# Patient Record
Sex: FEMALE
[De-identification: names, ages, dates, MRNs, and addresses within clinical notes are randomized per-mention and may not be internally consistent; named-entity substitution may affect disease eponyms.]

---

## 2017-03-14 ENCOUNTER — HOSPITAL ENCOUNTER (EMERGENCY)
Dept: HOSPITAL 82 - ED | Age: 47
LOS: 1 days | Discharge: HOME | DRG: 392 | End: 2017-03-15
Payer: SELF-PAY

## 2017-03-14 VITALS — BODY MASS INDEX: 29.3 KG/M2 | HEIGHT: 63 IN | WEIGHT: 165.35 LBS

## 2017-03-14 DIAGNOSIS — R10.13: Primary | ICD-10-CM

## 2017-03-14 DIAGNOSIS — R11.2: ICD-10-CM

## 2017-03-14 DIAGNOSIS — R10.11: ICD-10-CM

## 2017-03-14 LAB
ALBUMIN SERPL-MCNC: 4 G/DL (ref 3.2–5)
ALP SERPL-CCNC: 91 U/L (ref 38–126)
ALT SERPL-CCNC: 128 U/L (ref 9–52)
AMYLASE SERPL-CCNC: 70 U/L (ref 30–110)
ANION GAP SERPL CALCULATED.3IONS-SCNC: 16 MMOL/L
AST SERPL-CCNC: 88 U/L (ref 14–36)
BASOPHILS NFR BLD AUTO: 1 % (ref 0–3)
BILIRUB UR QL STRIP.AUTO: NEGATIVE
BUN SERPL-MCNC: 12 MG/DL (ref 7–17)
BUN/CREAT SERPL: 21
CALCIUM SERPL-MCNC: 9 MG/DL (ref 8.4–10.2)
CHLORIDE SERPL-SCNC: 107 MMOL/L (ref 95–108)
CLARITY UR: CLEAR
CO2 SERPL-SCNC: 24 MMOL/L (ref 22–30)
COLOR UR AUTO: YELLOW
CREAT SERPL-MCNC: 0.6 MG/DL (ref 0.5–1)
EOSINOPHIL NFR BLD AUTO: 4 % (ref 0–8)
ERYTHROCYTE [DISTWIDTH] IN BLOOD BY AUTOMATED COUNT: 12.1 % (ref 11.5–15.5)
GLUCOSE SERPL-MCNC: 89 MG/DL (ref 65–105)
GLUCOSE UR STRIP.AUTO-MCNC: NEGATIVE MG/DL
HCT VFR BLD AUTO: 42 % (ref 37–47)
HGB BLD-MCNC: 14.4 G/DL (ref 12–16)
HGB UR QL STRIP.AUTO: NEGATIVE
IMM GRANULOCYTES NFR BLD: 0.6 % (ref 0–1)
KETONES UR STRIP.AUTO-MCNC: NEGATIVE MG/DL
LEUKOCYTE ESTERASE UR QL STRIP.AUTO: NEGATIVE
LIPASE SERPL-CCNC: 74 U/L (ref 23–300)
LYMPHOCYTES NFR BLD: 43 % (ref 15–41)
MCH RBC QN AUTO: 31.6 PG  CALC (ref 26–32)
MCHC RBC AUTO-ENTMCNC: 34.3 G/L CALC (ref 32–36)
MCV RBC AUTO: 92.1 FL  CALC (ref 80–100)
MONOCYTES NFR BLD AUTO: 10 % (ref 2–13)
NEUTROPHILS # BLD AUTO: 2.57 THOU/UL (ref 2–7.15)
NEUTROPHILS NFR BLD AUTO: 42 % (ref 42–76)
NITRITE UR QL STRIP.AUTO: NEGATIVE
PH UR STRIP.AUTO: 6 [PH] (ref 4.5–8)
PLATELET # BLD AUTO: 300 THOU/UL (ref 130–400)
POTASSIUM SERPL-SCNC: 3.9 MMOL/L (ref 3.5–5.1)
PROT SERPL-MCNC: 7.7 G/DL (ref 6.3–8.2)
PROT UR QL STRIP.AUTO: NEGATIVE MG/DL
RBC # BLD AUTO: 4.56 MILL/UL (ref 4.2–5.6)
SODIUM SERPL-SCNC: 142 MMOL/L (ref 137–146)
SP GR UR STRIP.AUTO: 1.02
UROBILINOGEN UR QL STRIP.AUTO: 0.2 E.U./DL

## 2017-03-14 PROCEDURE — S0164 INJECTION PANTROPRAZOLE: HCPCS

## 2017-03-15 VITALS — SYSTOLIC BLOOD PRESSURE: 138 MMHG | DIASTOLIC BLOOD PRESSURE: 76 MMHG

## 2017-04-26 ENCOUNTER — HOSPITAL ENCOUNTER (EMERGENCY)
Dept: HOSPITAL 82 - ED | Age: 47
Discharge: HOME | DRG: 605 | End: 2017-04-26
Payer: SELF-PAY

## 2017-04-26 VITALS — BODY MASS INDEX: 26.56 KG/M2 | HEIGHT: 63 IN | WEIGHT: 149.91 LBS

## 2017-04-26 VITALS — DIASTOLIC BLOOD PRESSURE: 74 MMHG | SYSTOLIC BLOOD PRESSURE: 149 MMHG
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
## 2017-04-26 DIAGNOSIS — S30.1XXA: Primary | ICD-10-CM

## 2017-04-26 DIAGNOSIS — R10.84: ICD-10-CM

## 2017-04-26 LAB
ALBUMIN SERPL-MCNC: 4.4 G/DL (ref 3.2–5)
ALP SERPL-CCNC: 131 U/L (ref 38–126)
ALT SERPL-CCNC: 210 U/L (ref 9–52)
ANION GAP SERPL CALCULATED.3IONS-SCNC: 15 MMOL/L
AST SERPL-CCNC: 142 U/L (ref 14–36)
BASOPHILS NFR BLD AUTO: 1 % (ref 0–3)
BILIRUB UR QL STRIP.AUTO: NEGATIVE
BUN SERPL-MCNC: 17 MG/DL (ref 7–17)
BUN/CREAT SERPL: 26
CALCIUM SERPL-MCNC: 9.5 MG/DL (ref 8.4–10.2)
CHLORIDE SERPL-SCNC: 104 MMOL/L (ref 95–108)
CLARITY UR: CLEAR
CO2 SERPL-SCNC: 26 MMOL/L (ref 22–30)
COLOR UR AUTO: YELLOW
CREAT SERPL-MCNC: 0.6 MG/DL (ref 0.5–1)
EOSINOPHIL NFR BLD AUTO: 2 % (ref 0–8)
ERYTHROCYTE [DISTWIDTH] IN BLOOD BY AUTOMATED COUNT: 12.4 % (ref 11.5–15.5)
GLUCOSE SERPL-MCNC: 115 MG/DL (ref 65–105)
GLUCOSE UR STRIP.AUTO-MCNC: NEGATIVE MG/DL
HCT VFR BLD AUTO: 40.4 % (ref 37–47)
HGB BLD-MCNC: 14.3 G/DL (ref 12–16)
HGB UR QL STRIP.AUTO: (no result)
IMM GRANULOCYTES NFR BLD: 0.3 % (ref 0–1)
KETONES UR STRIP.AUTO-MCNC: NEGATIVE MG/DL
LEUKOCYTE ESTERASE UR QL STRIP.AUTO: NEGATIVE
LYMPHOCYTES NFR BLD: 21 % (ref 15–41)
MCH RBC QN AUTO: 32.9 PG  CALC (ref 26–32)
MCHC RBC AUTO-ENTMCNC: 35.4 G/L CALC (ref 32–36)
MCV RBC AUTO: 92.9 FL  CALC (ref 80–100)
MONOCYTES NFR BLD AUTO: 9 % (ref 2–13)
NEUTROPHILS # BLD AUTO: 5.25 THOU/UL (ref 2–7.15)
NEUTROPHILS NFR BLD AUTO: 67 % (ref 42–76)
NITRITE UR QL STRIP.AUTO: NEGATIVE
PH UR STRIP.AUTO: 6.5 [PH] (ref 4.5–8)
PLATELET # BLD AUTO: 290 THOU/UL (ref 130–400)
POTASSIUM SERPL-SCNC: 3.9 MMOL/L (ref 3.5–5.1)
PROT SERPL-MCNC: 8.4 G/DL (ref 6.3–8.2)
PROT UR QL STRIP.AUTO: (no result) MG/DL
RBC # BLD AUTO: 4.35 MILL/UL (ref 4.2–5.6)
SODIUM SERPL-SCNC: 141 MMOL/L (ref 137–146)
SP GR UR STRIP.AUTO: 1.01
UROBILINOGEN UR QL STRIP.AUTO: 0.2 E.U./DL

## 2018-10-20 ENCOUNTER — HOSPITAL ENCOUNTER (EMERGENCY)
Dept: HOSPITAL 82 - ED | Age: 48
LOS: 1 days | Discharge: LEFT BEFORE DISCHARGE | DRG: 313 | End: 2018-10-21
Payer: SELF-PAY

## 2018-10-20 VITALS — SYSTOLIC BLOOD PRESSURE: 120 MMHG | DIASTOLIC BLOOD PRESSURE: 62 MMHG

## 2018-10-20 VITALS — HEIGHT: 63 IN | WEIGHT: 150.36 LBS | BODY MASS INDEX: 26.64 KG/M2

## 2018-10-20 DIAGNOSIS — R07.9: Primary | ICD-10-CM

## 2018-10-20 DIAGNOSIS — Z91.19: ICD-10-CM

## 2018-10-20 DIAGNOSIS — F41.9: ICD-10-CM

## 2018-10-20 DIAGNOSIS — E66.9: ICD-10-CM

## 2018-10-20 LAB
ALBUMIN SERPL-MCNC: 4.1 G/DL (ref 3.2–5)
ALP SERPL-CCNC: 134 U/L (ref 38–126)
ANION GAP SERPL CALCULATED.3IONS-SCNC: 14 MMOL/L
APTT PPP: 25.7 SECONDS (ref 20–32.5)
AST SERPL-CCNC: 51 U/L (ref 14–36)
BASOPHILS NFR BLD AUTO: 1 % (ref 0–3)
BUN SERPL-MCNC: 21 MG/DL (ref 7–17)
BUN/CREAT SERPL: 35
CHLORIDE SERPL-SCNC: 108 MMOL/L (ref 95–108)
CO2 SERPL-SCNC: 25 MMOL/L (ref 22–30)
CREAT SERPL-MCNC: 0.6 MG/DL (ref 0.5–1)
EOSINOPHIL NFR BLD AUTO: 3 % (ref 0–8)
ERYTHROCYTE [DISTWIDTH] IN BLOOD BY AUTOMATED COUNT: 12 % (ref 11.5–15.5)
HCT VFR BLD AUTO: 40.1 % (ref 37–47)
HGB BLD-MCNC: 13.8 G/DL (ref 12–16)
IMM GRANULOCYTES NFR BLD: 0.2 % (ref 0–5)
INR PPP: 1 RATIO (ref 0.7–1.3)
LYMPHOCYTES NFR BLD: 41 % (ref 15–41)
MCH RBC QN AUTO: 32.7 PG  CALC (ref 26–32)
MCHC RBC AUTO-ENTMCNC: 34.4 G/L CALC (ref 32–36)
MCV RBC AUTO: 95 FL  CALC (ref 80–100)
MONOCYTES NFR BLD AUTO: 9 % (ref 2–13)
MYOGLOBIN SERPL-MCNC: 32 NG/ML (ref 0–62)
NEUTROPHILS # BLD AUTO: 3.77 THOU/UL (ref 2–7.15)
NEUTROPHILS NFR BLD AUTO: 46 % (ref 42–76)
PLATELET # BLD AUTO: 311 THOU/UL (ref 130–400)
POTASSIUM SERPL-SCNC: 3.4 MMOL/L (ref 3.5–5.1)
PROT SERPL-MCNC: 7.7 G/DL (ref 6.3–8.2)
PROTHROMBIN TIME: 10.2 SECONDS (ref 9–12.5)
RBC # BLD AUTO: 4.22 MILL/UL (ref 4.2–5.6)
SODIUM SERPL-SCNC: 143 MMOL/L (ref 137–146)

## 2018-11-05 ENCOUNTER — HOSPITAL ENCOUNTER (EMERGENCY)
Dept: HOSPITAL 82 - ED | Age: 48
Discharge: HOME | DRG: 605 | End: 2018-11-05
Payer: SELF-PAY

## 2018-11-05 VITALS — HEIGHT: 63 IN | WEIGHT: 138.89 LBS | BODY MASS INDEX: 24.61 KG/M2

## 2018-11-05 VITALS — SYSTOLIC BLOOD PRESSURE: 138 MMHG | DIASTOLIC BLOOD PRESSURE: 73 MMHG
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
## 2018-11-05 DIAGNOSIS — R22.31: ICD-10-CM

## 2018-11-05 DIAGNOSIS — S61.451A: Primary | ICD-10-CM
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
## 2018-11-05 DIAGNOSIS — S60.511A: ICD-10-CM

## 2019-02-11 ENCOUNTER — HOSPITAL ENCOUNTER (EMERGENCY)
Dept: HOSPITAL 82 - ED | Age: 49
Discharge: HOME | DRG: 392 | End: 2019-02-11
Payer: SELF-PAY

## 2019-02-11 VITALS — SYSTOLIC BLOOD PRESSURE: 127 MMHG | DIASTOLIC BLOOD PRESSURE: 70 MMHG

## 2019-02-11 VITALS — WEIGHT: 198.42 LBS | HEIGHT: 63 IN | BODY MASS INDEX: 35.16 KG/M2

## 2019-02-11 DIAGNOSIS — R10.11: Primary | ICD-10-CM

## 2019-02-11 LAB
ALBUMIN SERPL-MCNC: 4.5 G/DL (ref 3.2–5)
ALP SERPL-CCNC: 84 U/L (ref 38–126)
ANION GAP SERPL CALCULATED.3IONS-SCNC: 15 MMOL/L
AST SERPL-CCNC: 89 U/L (ref 14–36)
BASOPHILS NFR BLD AUTO: 1 % (ref 0–3)
BILIRUB UR QL STRIP.AUTO: NEGATIVE
BUN SERPL-MCNC: 10 MG/DL (ref 7–17)
BUN/CREAT SERPL: 19
CHLORIDE SERPL-SCNC: 103 MMOL/L (ref 95–108)
CO2 SERPL-SCNC: 25 MMOL/L (ref 22–30)
COLOR UR AUTO: YELLOW
CREAT SERPL-MCNC: 0.5 MG/DL (ref 0.5–1)
EOSINOPHIL NFR BLD AUTO: 5 % (ref 0–8)
ERYTHROCYTE [DISTWIDTH] IN BLOOD BY AUTOMATED COUNT: 12.3 % (ref 11.5–15.5)
GLUCOSE UR STRIP.AUTO-MCNC: NEGATIVE MG/DL
HCT VFR BLD AUTO: 44.7 % (ref 37–47)
HGB BLD-MCNC: 15.3 G/DL (ref 12–16)
HGB UR QL STRIP.AUTO: NEGATIVE
IMM GRANULOCYTES NFR BLD: 0.2 % (ref 0–5)
KETONES UR STRIP.AUTO-MCNC: NEGATIVE MG/DL
LEUKOCYTE ESTERASE UR QL STRIP.AUTO: NEGATIVE
LIPASE SERPL-CCNC: 75 U/L (ref 23–300)
LYMPHOCYTES NFR BLD: 41 % (ref 15–41)
MCH RBC QN AUTO: 32.3 PG  CALC (ref 26–32)
MCHC RBC AUTO-ENTMCNC: 34.2 G/L CALC (ref 32–36)
MCV RBC AUTO: 94.3 FL  CALC (ref 80–100)
MONOCYTES NFR BLD AUTO: 11 % (ref 2–13)
NEUTROPHILS # BLD AUTO: 2.7 THOU/UL (ref 2–7.15)
NEUTROPHILS NFR BLD AUTO: 42 % (ref 42–76)
NITRITE UR QL STRIP.AUTO: NEGATIVE
PH UR STRIP.AUTO: 6.5 [PH] (ref 4.5–8)
PLATELET # BLD AUTO: 316 THOU/UL (ref 130–400)
POTASSIUM SERPL-SCNC: 4.3 MMOL/L (ref 3.5–5.1)
PROT SERPL-MCNC: 8.2 G/DL (ref 6.3–8.2)
PROT UR QL STRIP.AUTO: NEGATIVE MG/DL
RBC # BLD AUTO: 4.74 MILL/UL (ref 4.2–5.6)
SODIUM SERPL-SCNC: 139 MMOL/L (ref 137–146)
SP GR UR STRIP.AUTO: 1.01
UROBILINOGEN UR QL STRIP.AUTO: 0.2 E.U./DL

## 2020-02-09 ENCOUNTER — HOSPITAL ENCOUNTER (EMERGENCY)
Age: 50
Discharge: HOME | DRG: 153 | End: 2020-02-09
Payer: SELF-PAY

## 2020-02-09 DIAGNOSIS — H66.92: Primary | ICD-10-CM

## 2020-02-09 DIAGNOSIS — J02.9: ICD-10-CM

## 2020-02-09 LAB
BILIRUB UR QL STRIP.AUTO: NEGATIVE
COLOR UR AUTO: YELLOW
GLUCOSE UR STRIP.AUTO-MCNC: NEGATIVE MG/DL
HGB UR QL STRIP.AUTO: NEGATIVE
KETONES UR STRIP.AUTO-MCNC: NEGATIVE MG/DL
LEUKOCYTE ESTERASE UR QL STRIP.AUTO: (no result)
NITRITE UR QL STRIP.AUTO: NEGATIVE
PH UR STRIP.AUTO: 6.5 [PH] (ref 4.5–8)
PROT UR QL STRIP.AUTO: NEGATIVE MG/DL
SP GR UR STRIP.AUTO: 1.02
UROBILINOGEN UR QL STRIP.AUTO: 0.2 E.U./DL

## 2020-09-02 ENCOUNTER — HOSPITAL ENCOUNTER (OUTPATIENT)
Dept: HOSPITAL 82 - ED | Age: 50
Setting detail: OBSERVATION
LOS: 1 days | Discharge: HOME | DRG: 313 | End: 2020-09-03
Attending: INTERNAL MEDICINE | Admitting: INTERNAL MEDICINE
Payer: SELF-PAY

## 2020-09-02 VITALS — SYSTOLIC BLOOD PRESSURE: 149 MMHG | DIASTOLIC BLOOD PRESSURE: 76 MMHG

## 2020-09-02 VITALS — WEIGHT: 172.37 LBS | BODY MASS INDEX: 33.84 KG/M2 | HEIGHT: 60 IN

## 2020-09-02 VITALS — SYSTOLIC BLOOD PRESSURE: 135 MMHG | DIASTOLIC BLOOD PRESSURE: 66 MMHG

## 2020-09-02 VITALS — DIASTOLIC BLOOD PRESSURE: 73 MMHG | SYSTOLIC BLOOD PRESSURE: 141 MMHG

## 2020-09-02 DIAGNOSIS — Z20.828: ICD-10-CM

## 2020-09-02 DIAGNOSIS — I49.3: ICD-10-CM

## 2020-09-02 DIAGNOSIS — I49.1: ICD-10-CM

## 2020-09-02 DIAGNOSIS — R00.1: ICD-10-CM

## 2020-09-02 DIAGNOSIS — R42: ICD-10-CM

## 2020-09-02 DIAGNOSIS — R07.89: Primary | ICD-10-CM

## 2020-09-02 LAB
ALBUMIN SERPL-MCNC: 4.1 G/DL (ref 3.2–5)
ALP SERPL-CCNC: 109 U/L (ref 38–126)
ANION GAP SERPL CALCULATED.3IONS-SCNC: 10 MMOL/L
AST SERPL-CCNC: 59 U/L (ref 14–36)
BASOPHILS NFR BLD AUTO: 1 % (ref 0–3)
BUN SERPL-MCNC: 17 MG/DL (ref 7–17)
BUN/CREAT SERPL: 31
CHLORIDE SERPL-SCNC: 105 MMOL/L (ref 95–108)
CO2 SERPL-SCNC: 27 MMOL/L (ref 22–30)
CREAT SERPL-MCNC: 0.6 MG/DL (ref 0.5–1)
EOSINOPHIL NFR BLD AUTO: 4 % (ref 0–8)
ERYTHROCYTE [DISTWIDTH] IN BLOOD BY AUTOMATED COUNT: 12.2 % (ref 11.5–15.5)
HCT VFR BLD AUTO: 42.9 % (ref 37–47)
HGB BLD-MCNC: 14.3 G/DL (ref 12–16)
IMM GRANULOCYTES NFR BLD: 0.1 % (ref 0–5)
LIPASE SERPL-CCNC: 83 U/L (ref 23–300)
LYMPHOCYTES NFR BLD: 43 % (ref 15–41)
MCH RBC QN AUTO: 31.6 PG  CALC (ref 26–32)
MCHC RBC AUTO-ENTMCNC: 33.3 G/DL CAL (ref 32–36)
MCV RBC AUTO: 94.7 FL  CALC (ref 80–100)
MONOCYTES NFR BLD AUTO: 10 % (ref 2–13)
NEUTROPHILS # BLD AUTO: 2.83 THOU/UL (ref 2–7.15)
NEUTROPHILS NFR BLD AUTO: 42 % (ref 42–76)
PLATELET # BLD AUTO: 267 THOU/UL (ref 130–400)
POTASSIUM SERPL-SCNC: 3.5 MMOL/L (ref 3.5–5.1)
PROT SERPL-MCNC: 7.6 G/DL (ref 6.3–8.2)
RBC # BLD AUTO: 4.53 MILL/UL (ref 4.2–5.6)
SODIUM SERPL-SCNC: 139 MMOL/L (ref 137–146)

## 2020-09-02 PROCEDURE — G0378 HOSPITAL OBSERVATION PER HR: HCPCS

## 2020-09-02 NOTE — NUR
UGO MARCIAL AND STEFANIA AT BEDSIDE DISCUSSING ADMISSION. PT PLACED CONCERNS AND PLAN
OF CARE MENTIONED.

## 2020-09-02 NOTE — NUR
PT BRADYCARDIC WITH HR FLUCTUATING FROM 42-50 BPM. AGGIE ARIZMENDI NOTIFIED,
ALREADY AWARE OF TREND. CONTINUE TO MONITOR PT. PT CURRENTLY ASYMPTOMATIC
BESIDES FEELING DIZZINESS. CALL LIGHT IN REACH. CONTINUE TO MONITOR.

## 2020-09-02 NOTE — NUR
Admission Note
 
Report Given to:         THAI
Transported by:          X Wheelchair           Stretcher
 
Transported with:        X Nurse     Transporter   X Patent IV    O2
                         X Cardiac Monitor
 
Location:                 ICU      X MS2
 
 
          
 
         PT TRANSPORTED STABLE AND IN NO DISTRESS TO MED SURG. CARE ASSUMED TO
THAI

## 2020-09-02 NOTE — NUR
PT RESTING IN BED, NO SIGNS OF DISTRESS NOTED, RESP EVEN AND UNLABORED, PT
ALERT AND ORIENTED X3, DISCUSSED POC, DENIES ANY PAIN AT THIS TIME. ASSESSMENT
COMPLETED. CALL LIGHT IN REACH,CONTINUE TO MONITOR.

## 2020-09-02 NOTE — NUR
PT RECONNECTED TO MONITORING EQUIPMENT. DENIES ANY NEEDS. CHEST PAIN HAS
REDUCED TO 0/10. CALL LIGHT WITHIN REACH

## 2020-09-02 NOTE — NUR
PT SITTING ON THE SIDE OF THE BED EATING DINNER. NO NEEDS AT THIS TIME. CALL
LIGHT IN REACH. CONTINUE TO MONITOR.

## 2020-09-02 NOTE — NUR
PT AMBULATED WITH STEADY GAIT TO ROOM AND STATES HAVING PAIN 7/10 IN THE CHEST.
PT DENIES N/V, BLURRED VISION, OR SOB. SHE DOES ADMIT TO HAVING DIZZINESS.
SINUS THERESA OF 47-56 BPM. STATING OF INTERM WAVES OF POTENTIAL SYNCOPY. SKIN
WNL.WEAKNESS NOTED. NIH 0. PERRLA. AOX4. STATES HAVING "HEART PROBLEMS' 2 YEARS
AGO BUT CANNOT BE SPECIFIC. DENIES TAKING ANY MEDICATIONS DAILY, DENIES ANY
PRESCRIPTIONS PRESCRIBED. UPPER LEFT CHEST TENDER UPON PALPATION. WILL CONTINUE
TO MONITOR.
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
## 2020-09-03 VITALS — DIASTOLIC BLOOD PRESSURE: 84 MMHG | SYSTOLIC BLOOD PRESSURE: 145 MMHG

## 2020-09-03 VITALS — DIASTOLIC BLOOD PRESSURE: 97 MMHG | SYSTOLIC BLOOD PRESSURE: 134 MMHG

## 2020-09-03 VITALS — SYSTOLIC BLOOD PRESSURE: 146 MMHG | DIASTOLIC BLOOD PRESSURE: 81 MMHG

## 2020-09-03 VITALS — DIASTOLIC BLOOD PRESSURE: 66 MMHG | SYSTOLIC BLOOD PRESSURE: 103 MMHG

## 2020-09-03 VITALS — SYSTOLIC BLOOD PRESSURE: 133 MMHG | DIASTOLIC BLOOD PRESSURE: 72 MMHG

## 2020-09-03 LAB
ALBUMIN SERPL-MCNC: 3.8 G/DL (ref 3.2–5)
ALP SERPL-CCNC: 93 U/L (ref 38–126)
ANION GAP SERPL CALCULATED.3IONS-SCNC: 9 MMOL/L
AST SERPL-CCNC: 55 U/L (ref 14–36)
BASOPHILS NFR BLD AUTO: 1 % (ref 0–3)
BUN SERPL-MCNC: 14 MG/DL (ref 7–17)
BUN/CREAT SERPL: 30
CHLORIDE SERPL-SCNC: 104 MMOL/L (ref 95–108)
CHOLEST SERPL-MCNC: 192 MG/DL (ref 0–199)
CHOLEST/HDLC SERPL: 5 {RATIO}
CO2 SERPL-SCNC: 28 MMOL/L (ref 22–30)
CREAT SERPL-MCNC: 0.5 MG/DL (ref 0.5–1)
EOSINOPHIL NFR BLD AUTO: 5 % (ref 0–8)
ERYTHROCYTE [DISTWIDTH] IN BLOOD BY AUTOMATED COUNT: 12 % (ref 11.5–15.5)
HCT VFR BLD AUTO: 40.5 % (ref 37–47)
HDLC SERPL-MCNC: 39 MG/DL (ref 40–?)
HGB BLD-MCNC: 13.6 G/DL (ref 12–16)
IMM GRANULOCYTES NFR BLD: 0 % (ref 0–5)
LDLC SERPL CALC-MCNC: 124 MG/DL
LYMPHOCYTES NFR BLD: 46 % (ref 15–41)
MAGNESIUM SERPL-MCNC: 2.1 MG/DL (ref 1.6–2.3)
MCH RBC QN AUTO: 31.8 PG  CALC (ref 26–32)
MCHC RBC AUTO-ENTMCNC: 33.6 G/DL CAL (ref 32–36)
MCV RBC AUTO: 94.6 FL  CALC (ref 80–100)
MONOCYTES NFR BLD AUTO: 8 % (ref 2–13)
NEUTROPHILS # BLD AUTO: 2.32 THOU/UL (ref 2–7.15)
NEUTROPHILS NFR BLD AUTO: 41 % (ref 42–76)
PLATELET # BLD AUTO: 246 THOU/UL (ref 130–400)
POTASSIUM SERPL-SCNC: 3.7 MMOL/L (ref 3.5–5.1)
PROT SERPL-MCNC: 7 G/DL (ref 6.3–8.2)
RBC # BLD AUTO: 4.28 MILL/UL (ref 4.2–5.6)
SODIUM SERPL-SCNC: 137 MMOL/L (ref 137–146)
TRIGL SERPL-MCNC: 148 MG/DL (ref 30–149)
VLDLC SERPL CALC-MCNC: 30 MG/DL

## 2020-09-03 NOTE — NUR
IV SITE D/C'D, CATH INTACT, 2X2 WRAPPED WITH COBAN APPLIED. TELE REMOVED. PT
DOWN TO LOBBY [facility]ELCHAIR[facility]

## 2020-09-03 NOTE — NUR
PT C/O WEAKNESS AND FEELING SOB. VS TAKEN. 145/84, HR 53 AND O2 AT 95% RA. HOB
ELEVATED. PT REPORTS TO FEEL SLIGHTLY BETTER AFTER HOB WAS ELEVATED. CALL
LIGHT IN REACH. ENCOURAGED PT TO CALL IF SOB RETURNED.

## 2020-09-03 NOTE — NUR
PT RESTING IN BED, NO SIGNS OF DISTRESS NOTED, RESP EVEN AND UNLABORED.
REQUESTING TEA. CALL LIGHT IN REACH,CONTINUE TO MONITOR.

## 2020-09-03 NOTE — NUR
D/C INSTRUCTIONS GIVEN AND REVIEWED WITH PT. PT STILL AWAITING ON TRANSPORT TO
GO HOME. IV LEFT IN PLACE AND TO BE REMOVED WHEN D/C'D

## 2020-09-03 NOTE — NUR
PT LAYING IN BED WATCHING TV. A&O X3. NO DISTRESS NOTED. PT REPORTS TO BE
FEELING SLIGHTLY BETTER THAN YESTERDAY. NO OTHER NEEDS AT THIS TIME.
ASSESSMENT COMPLETED. DISCUSSED POC. CALL LIGHT IN REACH. CONTINUE TO MONITOR.

## 2021-03-02 ENCOUNTER — HOSPITAL ENCOUNTER (OUTPATIENT)
Dept: HOSPITAL 82 - ED | Age: 51
Setting detail: OBSERVATION
LOS: 2 days | Discharge: HOME | DRG: 313 | End: 2021-03-04
Attending: INTERNAL MEDICINE | Admitting: INTERNAL MEDICINE
Payer: SELF-PAY

## 2021-03-02 VITALS — SYSTOLIC BLOOD PRESSURE: 138 MMHG | DIASTOLIC BLOOD PRESSURE: 91 MMHG

## 2021-03-02 VITALS — SYSTOLIC BLOOD PRESSURE: 138 MMHG | DIASTOLIC BLOOD PRESSURE: 66 MMHG

## 2021-03-02 VITALS — SYSTOLIC BLOOD PRESSURE: 140 MMHG | DIASTOLIC BLOOD PRESSURE: 79 MMHG

## 2021-03-02 VITALS — HEIGHT: 60 IN | WEIGHT: 178.57 LBS | BODY MASS INDEX: 35.06 KG/M2

## 2021-03-02 VITALS — SYSTOLIC BLOOD PRESSURE: 151 MMHG | DIASTOLIC BLOOD PRESSURE: 82 MMHG

## 2021-03-02 DIAGNOSIS — J11.1: ICD-10-CM

## 2021-03-02 DIAGNOSIS — R51.9: ICD-10-CM

## 2021-03-02 DIAGNOSIS — I10: ICD-10-CM

## 2021-03-02 DIAGNOSIS — I49.5: ICD-10-CM

## 2021-03-02 DIAGNOSIS — Z20.822: ICD-10-CM

## 2021-03-02 DIAGNOSIS — Z95.0: ICD-10-CM

## 2021-03-02 DIAGNOSIS — R07.89: Primary | ICD-10-CM

## 2021-03-02 DIAGNOSIS — M54.2: ICD-10-CM

## 2021-03-02 LAB
ALBUMIN SERPL-MCNC: 4.2 G/DL (ref 3.2–5)
ALP SERPL-CCNC: 93 U/L (ref 38–126)
AMYLASE SERPL-CCNC: 73 U/L (ref 30–110)
ANION GAP SERPL CALCULATED.3IONS-SCNC: 11 MMOL/L
APTT PPP: 25.4 SECONDS (ref 20–32.5)
AST SERPL-CCNC: 63 U/L (ref 14–36)
BASOPHILS NFR BLD AUTO: 1 % (ref 0–3)
BILIRUB UR QL STRIP.AUTO: NEGATIVE
BUN SERPL-MCNC: 14 MG/DL (ref 7–17)
BUN/CREAT SERPL: 24
CHLORIDE SERPL-SCNC: 104 MMOL/L (ref 95–108)
CO2 SERPL-SCNC: 28 MMOL/L (ref 22–30)
COLOR UR AUTO: YELLOW
CREAT SERPL-MCNC: 0.6 MG/DL (ref 0.5–1)
D DIMER PPP FEU-MCNC: 0.25 MG/L (ref 0.19–0.6)
EOSINOPHIL NFR BLD AUTO: 6 % (ref 0–8)
ERYTHROCYTE [DISTWIDTH] IN BLOOD BY AUTOMATED COUNT: 12.4 % (ref 11.5–15.5)
GLUCOSE UR STRIP.AUTO-MCNC: NEGATIVE MG/DL
HCT VFR BLD AUTO: 44.3 % (ref 37–47)
HGB BLD-MCNC: 14.6 G/DL (ref 12–16)
HGB UR QL STRIP.AUTO: NEGATIVE
IMM GRANULOCYTES NFR BLD: 0.3 % (ref 0–5)
INR PPP: 1.1 RATIO (ref 0.7–1.3)
KETONES UR STRIP.AUTO-MCNC: NEGATIVE MG/DL
LEUKOCYTE ESTERASE UR QL STRIP.AUTO: (no result)
LIPASE SERPL-CCNC: 76 U/L (ref 23–300)
LYMPHOCYTES NFR BLD: 42 % (ref 15–41)
MCH RBC QN AUTO: 31.7 PG  CALC (ref 26–32)
MCHC RBC AUTO-ENTMCNC: 33 G/DL CAL (ref 32–36)
MCV RBC AUTO: 96.3 FL  CALC (ref 80–100)
MONOCYTES NFR BLD AUTO: 9 % (ref 2–13)
NEUTROPHILS # BLD AUTO: 2.88 THOU/UL (ref 2–7.15)
NEUTROPHILS NFR BLD AUTO: 42 % (ref 42–76)
NITRITE UR QL STRIP.AUTO: NEGATIVE
PH UR STRIP.AUTO: 7 [PH] (ref 4.5–8)
PLATELET # BLD AUTO: 255 THOU/UL (ref 130–400)
POTASSIUM SERPL-SCNC: 3.9 MMOL/L (ref 3.5–5.1)
PROT SERPL-MCNC: 8 G/DL (ref 6.3–8.2)
PROT UR QL STRIP.AUTO: NEGATIVE MG/DL
PROTHROMBIN TIME: 11.1 SECONDS (ref 9–12.5)
RBC # BLD AUTO: 4.6 MILL/UL (ref 4.2–5.6)
RBC #/AREA URNS HPF: (no result) RBC/HPF (ref 0–5)
SODIUM SERPL-SCNC: 139 MMOL/L (ref 137–146)
SP GR UR STRIP.AUTO: 1.02
SQUAMOUS URNS QL MICRO: (no result) EPI/HPF
UROBILINOGEN UR QL STRIP.AUTO: 0.2 E.U./DL
WBC #/AREA URNS HPF: (no result) WBC/HPF (ref 0–5)

## 2021-03-02 PROCEDURE — G0378 HOSPITAL OBSERVATION PER HR: HCPCS

## 2021-03-02 NOTE — NUR
PT LAYING IN BED WITH EYES CLOSED, RESPIRATIONS REGULAR AND UNLABORED, APPEARS
TO BE SLEEPING COMFORTABLY. PT WAKES EASILY WITH VERBAL STIMULI. PHYSICAL
ASSESMENT COMPLETE. PT REPORTS HEAD AND NECK PAIN ARE GONE AND SHE IS NOW
EXPERIENCING PAIN TO RUE, ELBOW TO HAND. C/S/M INTACT. PRN APAP AND FLEXERIL
ADMINISTERED CO-CURRENTLY WITH SCHEDULED MEDICATIONS. PLAN OF CARE REVIEWED,
PT VERBALIZES UNDERSTANDING AND DENIES QUESTIONS. DENTURE CUP PROVIDED FOR
PT'S DENTURES. PT DENIES FURTHER NEEDS AT THIS TIME. CALL BELL WITHIN REACH,
AGREES TO CALL PRN.

## 2021-03-02 NOTE — NUR
REPORT RECEIVED FROM SAW SANTOS. PT CURRENTLY LOCATED IN ER ROOM 6 ON
DROPLET PRECAUTIONS FOR POSITIVE FLU A SWAB. RESTING ON STRETCHER SEMI
FOWLERS; ALERT AND ORIENTED X 3; Prydeinig SPEAKING ONLY;  AT BEDSIDE
FOR ADMISSION ASSESSMENT. PT DENIES PAIN CURRENTLY. RESPIRATIONS EVEN AND
UNLABORED ON ROOM AIR. SKIN IS FLUSHED, HOT, AND MOIST; AFEBRILE 96.9; VSS. IV
SITE TO LEFT WRIST APPEARS HEALTHY AND FLUSHES. POC DISCUSSED. PT ENCOURAGED
TO VERBALIZE CONCERNS; STATES UNDERSTANDING. SAFETY MEASURES IN PLACE. CALL
LIGHT WITHIN REACH.

## 2021-03-02 NOTE — NUR
TRANSPORTED TO [facility] ROOM 263 VIA STRETCHER; ALERT AND ORIENTED. AMBULATED
TO BATHROOM FOR VOID AND INTO BED. TELEMETRY BOX APPLIED AND VERIFIED WITH
MONTIOR TECH; SINUS RHYTHM HR 60. VSS. MEDICATIONS ADMINSITERED. ORIENTED TO
NEW ROOM AND CALL LIGHT SYSTEM.  AT BEDSIDE TO REVIEW POC AND
REVIEW VISITOR POLICY. SAFETY MEASURES IN PLACE. CALL LIGHT WITHIN REACH.

## 2021-03-02 NOTE — NUR
PATIENT WILL BENEFIT FROM PHYSICAL THERAPY. ORDER FOR PHYSCIAL THERAPY
EVALUATION, TREATMENT, AND RECOMMENDATION.

## 2021-03-02 NOTE — NUR
IV INTIATED TO LEFT WRIST AREA WITH BLOOD SPECIMENS AND FIRST SET OF BLOOD
CULTURES OBTAINED. SWABS COLLECTED AS PER ORDER. PT AMBULATORY TO BR FOR URINE
SPECIMEN. DISCUSSED WITH PT WAIT TIME FOR RESULTS. VERBALIZED UNDERSTANIDNG.
DENIES ANY NEEDS. CALL LIGHT WITHIN REACH.

## 2021-03-03 VITALS — DIASTOLIC BLOOD PRESSURE: 76 MMHG | SYSTOLIC BLOOD PRESSURE: 143 MMHG

## 2021-03-03 VITALS — DIASTOLIC BLOOD PRESSURE: 72 MMHG | SYSTOLIC BLOOD PRESSURE: 111 MMHG

## 2021-03-03 VITALS — SYSTOLIC BLOOD PRESSURE: 140 MMHG | DIASTOLIC BLOOD PRESSURE: 84 MMHG

## 2021-03-03 VITALS — SYSTOLIC BLOOD PRESSURE: 147 MMHG | DIASTOLIC BLOOD PRESSURE: 83 MMHG

## 2021-03-03 VITALS — DIASTOLIC BLOOD PRESSURE: 63 MMHG | SYSTOLIC BLOOD PRESSURE: 130 MMHG

## 2021-03-03 VITALS — DIASTOLIC BLOOD PRESSURE: 75 MMHG | SYSTOLIC BLOOD PRESSURE: 122 MMHG

## 2021-03-03 LAB
CHOLEST SERPL-MCNC: 229 MG/DL (ref 0–199)
CHOLEST/HDLC SERPL: 4.9 {RATIO}
HDLC SERPL-MCNC: 46 MG/DL (ref 40–?)
LDLC SERPL CALC-MCNC: 159 MG/DL
MAGNESIUM SERPL-MCNC: 2.2 MG/DL (ref 1.6–2.3)
TRIGL SERPL-MCNC: 114 MG/DL (ref 30–149)
VLDLC SERPL CALC-MCNC: 23 MG/DL

## 2021-03-03 NOTE — NUR
PT TOOK A SHOWER, C/O R SIDED ABD DISCOMFORT. INFORMED THE NURSE PRACTIONER.
IV SITE REMAINS FREE FROM REDNESS OR EDEMA., CONTINUE TO OSBERVE AND MONITOR.

## 2021-03-03 NOTE — NUR
ASSESSMENT IS COMPLETED: IV SITE IS FREE FROM REDNESS OR EDEMA. HR IS
REG,PULSES ARE STRONG X4, ABD IS SOFT WITH ACTIV EBS. BREATH SOUDNS ARE
CLEAR,BILATERALLY, TELE MONITOR IN PLACE. C/O HEADACHE AND NECK DISCOMFORT

## 2021-03-03 NOTE — NUR
PT APPEARS TO BE SLEEPING COMFORTABLY, LAYING IN BED WITH EYES CLOSED,
RESPIRATIONS REGULAR AND UNLABORED, NO APPARENT DISTRESS. CALL BELL REMAIN
WITHIN REACH.

## 2021-03-03 NOTE — NUR
PT LYING IN BED SEMI-FOWLERS, TALKING ON MOBILE PHONE. PHYSICAL ASSESMENT
COMPLETE. PT REPORTS HER HEAD FEELS BETTER BUT HER LOWER ANTERIOR RIBS ARE IN
PAIN. REPORTS IT "HURTS IN HER BONES LIKE WHEN YOU HAVE THE FLU", PRN FLEXERIL
AND ULTRAM ADMINISTERED WITH SCHEDULED MEDICATIONS, SEE E-MAR. PLAN OF CARE
REVIEWED, PT PARTICIPATES IN TEACHING, VERBALIZES UNDERSTANDING AND DENIES
QUESTIONS. FRESH ICE WATER AND APPLE JUICE PROVIDED PER PTS REQUEST. PT DENIES
FURTHER NEEDS AT THIS TIME. CALL BELL WITHIN REACH, AGREES TO CALL PRN. BED
LOCKED IN LOW POSITION WITH BEDRAILS UP X2.

## 2021-03-04 VITALS — DIASTOLIC BLOOD PRESSURE: 69 MMHG | SYSTOLIC BLOOD PRESSURE: 144 MMHG

## 2021-03-04 VITALS — DIASTOLIC BLOOD PRESSURE: 66 MMHG | SYSTOLIC BLOOD PRESSURE: 153 MMHG

## 2021-03-04 VITALS — SYSTOLIC BLOOD PRESSURE: 146 MMHG | DIASTOLIC BLOOD PRESSURE: 81 MMHG

## 2021-03-04 NOTE — NUR
PT APPEARS TO BE SLEEPING COMFORTABLY, LAYING IN BED WITH EYES CLOSED,
RESPIRATIONS REGULAR AND UNLABORED, NO APPARENT DISTRESS. CALL BELL REMAIN
WITHIN REACH. BED REMAINS LOCKED IN LOW POSITION WITH BEDRAIL UP X2.

## 2021-03-04 NOTE — NUR
PT D/C INSTRUCTIONS GIVEN TO PT IN [place]e. PT VERBALIZES UNDERSTANDING. PT
ENCOURAGED TO RETURN IF NEW OR WORSENING SYMPTOMS OCCUR. PT INFORMED OF
SCHEDULED PCP APPOINTMENT FOR TOMORROW AT 8:30 AM.

## 2021-03-04 NOTE — NUR
ASSESSMENT IS COMPLETED: IV SITE IS FREE FROM REDNESS OR EDEMA/. HR IS
REG,PULSES ARE STRONG X4, ABD IS SOFT WITH ACTIVE BS. BREATH SOUNDS ARE
CLEAR,BILATERALLY. TELE MONITOR IN PLACE. CONTINUE TO OSBERVE AND MONITOR.

## 2021-03-22 ENCOUNTER — HOSPITAL ENCOUNTER (EMERGENCY)
Dept: HOSPITAL 82 - ED | Age: 51
Discharge: HOME | DRG: 103 | End: 2021-03-22
Payer: SELF-PAY

## 2021-03-22 VITALS — BODY MASS INDEX: 29.43 KG/M2 | WEIGHT: 149.91 LBS | HEIGHT: 60 IN

## 2021-03-22 VITALS — DIASTOLIC BLOOD PRESSURE: 57 MMHG | SYSTOLIC BLOOD PRESSURE: 139 MMHG

## 2021-03-22 DIAGNOSIS — R51.9: Primary | ICD-10-CM

## 2021-03-22 DIAGNOSIS — Z20.822: ICD-10-CM

## 2021-03-22 DIAGNOSIS — Z95.0: ICD-10-CM

## 2021-03-22 DIAGNOSIS — Z87.442: ICD-10-CM

## 2021-03-22 DIAGNOSIS — I10: ICD-10-CM

## 2021-03-22 LAB
ALBUMIN SERPL-MCNC: 4.5 G/DL (ref 3.2–5)
ALP SERPL-CCNC: 130 U/L (ref 38–126)
AMYLASE SERPL-CCNC: 92 U/L (ref 30–110)
ANION GAP SERPL CALCULATED.3IONS-SCNC: 15 MMOL/L
AST SERPL-CCNC: 71 U/L (ref 14–36)
BASOPHILS NFR BLD AUTO: 1 % (ref 0–3)
BUN SERPL-MCNC: 14 MG/DL (ref 7–17)
BUN/CREAT SERPL: 29
CHLORIDE SERPL-SCNC: 103 MMOL/L (ref 95–108)
CO2 SERPL-SCNC: 22 MMOL/L (ref 22–30)
CREAT SERPL-MCNC: 0.5 MG/DL (ref 0.5–1)
EOSINOPHIL NFR BLD AUTO: 2 % (ref 0–8)
ERYTHROCYTE [DISTWIDTH] IN BLOOD BY AUTOMATED COUNT: 12.3 % (ref 11.5–15.5)
HCT VFR BLD AUTO: 45.4 % (ref 37–47)
HGB BLD-MCNC: 15.3 G/DL (ref 12–16)
IMM GRANULOCYTES NFR BLD: 0.1 % (ref 0–5)
LIPASE SERPL-CCNC: 78 U/L (ref 23–300)
LYMPHOCYTES NFR BLD: 38 % (ref 15–41)
MCH RBC QN AUTO: 31.4 PG  CALC (ref 26–32)
MCHC RBC AUTO-ENTMCNC: 33.7 G/DL CAL (ref 32–36)
MCV RBC AUTO: 93.2 FL  CALC (ref 80–100)
MONOCYTES NFR BLD AUTO: 7 % (ref 2–13)
NEUTROPHILS # BLD AUTO: 5.25 THOU/UL (ref 2–7.15)
NEUTROPHILS NFR BLD AUTO: 52 % (ref 42–76)
PLATELET # BLD AUTO: 336 THOU/UL (ref 130–400)
POTASSIUM SERPL-SCNC: 3.9 MMOL/L (ref 3.5–5.1)
PROT SERPL-MCNC: 9.1 G/DL (ref 6.3–8.2)
RBC # BLD AUTO: 4.87 MILL/UL (ref 4.2–5.6)
SODIUM SERPL-SCNC: 136 MMOL/L (ref 137–146)

## 2021-09-09 ENCOUNTER — HOSPITAL ENCOUNTER (EMERGENCY)
Dept: HOSPITAL 82 - ED | Age: 51
Discharge: HOME | DRG: 103 | End: 2021-09-09
Payer: SELF-PAY

## 2021-09-09 VITALS — BODY MASS INDEX: 32.46 KG/M2 | HEIGHT: 60 IN | WEIGHT: 165.35 LBS

## 2021-09-09 VITALS — DIASTOLIC BLOOD PRESSURE: 77 MMHG | SYSTOLIC BLOOD PRESSURE: 160 MMHG

## 2021-09-09 DIAGNOSIS — Z20.822: ICD-10-CM

## 2021-09-09 DIAGNOSIS — Z95.0: ICD-10-CM

## 2021-09-09 DIAGNOSIS — R51.9: Primary | ICD-10-CM

## 2021-09-09 DIAGNOSIS — M79.10: ICD-10-CM

## 2021-09-09 DIAGNOSIS — I10: ICD-10-CM

## 2021-09-09 LAB
ALBUMIN SERPL-MCNC: 4 G/DL (ref 3.2–5)
ALP SERPL-CCNC: 117 U/L (ref 38–126)
AMYLASE SERPL-CCNC: 98 U/L (ref 30–110)
ANION GAP SERPL CALCULATED.3IONS-SCNC: 10 MMOL/L
APTT PPP: 23.8 SECONDS (ref 20–32.5)
AST SERPL-CCNC: 47 U/L (ref 14–36)
BASOPHILS NFR BLD AUTO: 1 % (ref 0–3)
BUN SERPL-MCNC: 17 MG/DL (ref 7–17)
BUN/CREAT SERPL: 31
CHLORIDE SERPL-SCNC: 105 MMOL/L (ref 95–108)
CO2 SERPL-SCNC: 27 MMOL/L (ref 22–30)
CREAT SERPL-MCNC: 0.6 MG/DL (ref 0.5–1)
D DIMER PPP FEU-MCNC: 0.33 MG/L (ref 0.19–0.6)
EOSINOPHIL NFR BLD AUTO: 2 % (ref 0–8)
ERYTHROCYTE [DISTWIDTH] IN BLOOD BY AUTOMATED COUNT: 12.3 % (ref 11.5–15.5)
HCG UR QL: NEGATIVE
HCT VFR BLD AUTO: 45.2 % (ref 37–47)
HGB BLD-MCNC: 15.4 G/DL (ref 12–16)
IMM GRANULOCYTES NFR BLD: 0.1 % (ref 0–5)
INR PPP: 1 RATIO (ref 0.7–1.3)
LIPASE SERPL-CCNC: 128 U/L (ref 23–300)
LYMPHOCYTES NFR BLD: 36 % (ref 15–41)
MCH RBC QN AUTO: 32.9 PG  CALC (ref 26–32)
MCHC RBC AUTO-ENTMCNC: 34.1 G/DL CAL (ref 32–36)
MCV RBC AUTO: 96.6 FL  CALC (ref 80–100)
MONOCYTES NFR BLD AUTO: 8 % (ref 2–13)
MYOGLOBIN SERPL-MCNC: 14 NG/ML (ref 0–62)
NEUTROPHILS # BLD AUTO: 4.75 THOU/UL (ref 2–7.15)
NEUTROPHILS NFR BLD AUTO: 53 % (ref 42–76)
PLATELET # BLD AUTO: 322 THOU/UL (ref 130–400)
POTASSIUM SERPL-SCNC: 3.4 MMOL/L (ref 3.5–5.1)
PROT SERPL-MCNC: 7.5 G/DL (ref 6.3–8.2)
PROTHROMBIN TIME: 10 SECONDS (ref 9–12.5)
RBC # BLD AUTO: 4.68 MILL/UL (ref 4.2–5.6)
SODIUM SERPL-SCNC: 139 MMOL/L (ref 137–146)

## 2022-03-06 ENCOUNTER — HOSPITAL ENCOUNTER (EMERGENCY)
Dept: HOSPITAL 82 - ED | Age: 52
LOS: 1 days | Discharge: HOME | DRG: 103 | End: 2022-03-07
Payer: SELF-PAY

## 2022-03-06 VITALS — DIASTOLIC BLOOD PRESSURE: 57 MMHG | SYSTOLIC BLOOD PRESSURE: 115 MMHG

## 2022-03-06 VITALS — HEIGHT: 60 IN | BODY MASS INDEX: 39.39 KG/M2 | WEIGHT: 200.62 LBS

## 2022-03-06 VITALS — SYSTOLIC BLOOD PRESSURE: 150 MMHG | DIASTOLIC BLOOD PRESSURE: 73 MMHG

## 2022-03-06 DIAGNOSIS — M79.10: ICD-10-CM

## 2022-03-06 DIAGNOSIS — R82.71: ICD-10-CM

## 2022-03-06 DIAGNOSIS — Z87.442: ICD-10-CM

## 2022-03-06 DIAGNOSIS — I10: ICD-10-CM

## 2022-03-06 DIAGNOSIS — R07.89: ICD-10-CM

## 2022-03-06 DIAGNOSIS — M25.50: ICD-10-CM

## 2022-03-06 DIAGNOSIS — Z95.0: ICD-10-CM

## 2022-03-06 DIAGNOSIS — R51.9: Primary | ICD-10-CM

## 2022-03-06 DIAGNOSIS — Z20.822: ICD-10-CM

## 2022-03-06 DIAGNOSIS — M72.2: ICD-10-CM

## 2022-03-07 VITALS — DIASTOLIC BLOOD PRESSURE: 78 MMHG | SYSTOLIC BLOOD PRESSURE: 138 MMHG

## 2022-03-07 VITALS — DIASTOLIC BLOOD PRESSURE: 84 MMHG | SYSTOLIC BLOOD PRESSURE: 139 MMHG

## 2022-03-07 VITALS — DIASTOLIC BLOOD PRESSURE: 61 MMHG | SYSTOLIC BLOOD PRESSURE: 144 MMHG

## 2022-03-07 VITALS — DIASTOLIC BLOOD PRESSURE: 66 MMHG | SYSTOLIC BLOOD PRESSURE: 145 MMHG

## 2022-03-07 LAB
ALBUMIN SERPL-MCNC: 3.9 G/DL (ref 3.2–5)
ALP SERPL-CCNC: 98 U/L (ref 38–126)
ANION GAP SERPL CALCULATED.3IONS-SCNC: 11 MMOL/L
AST SERPL-CCNC: 79 U/L (ref 14–36)
BACTERIA #/AREA URNS HPF: (no result) HPF
BASOPHILS NFR BLD AUTO: 1 % (ref 0–3)
BILIRUB UR QL STRIP.AUTO: NEGATIVE
BUN SERPL-MCNC: 10 MG/DL (ref 7–17)
BUN/CREAT SERPL: 20
CHLORIDE SERPL-SCNC: 106 MMOL/L (ref 95–108)
CK SERPL-CCNC: 136 U/L (ref 30–165)
CO2 SERPL-SCNC: 27 MMOL/L (ref 22–30)
COLOR UR AUTO: YELLOW
CREAT SERPL-MCNC: 0.5 MG/DL (ref 0.5–1)
CRP SERPL-MCNC: < 0.5 MG/DL (ref 0–0.9)
EOSINOPHIL NFR BLD AUTO: 7 % (ref 0–8)
EPI CELLS URNS QL MICRO: (no result) EPI/HPF
ERYTHROCYTE [DISTWIDTH] IN BLOOD BY AUTOMATED COUNT: 12.7 % (ref 11.5–15.5)
GLUCOSE UR STRIP.AUTO-MCNC: NEGATIVE MG/DL
HCT VFR BLD AUTO: 42.4 % (ref 37–47)
HGB BLD-MCNC: 13.7 G/DL (ref 12–16)
HGB UR QL STRIP.AUTO: NEGATIVE
IMM GRANULOCYTES NFR BLD: 0 % (ref 0–5)
KETONES UR STRIP.AUTO-MCNC: NEGATIVE MG/DL
LEUKOCYTE ESTERASE UR QL STRIP.AUTO: (no result)
LYMPHOCYTES NFR BLD: 46 % (ref 15–41)
MAGNESIUM SERPL-MCNC: 2.1 MG/DL (ref 1.6–2.3)
MCH RBC QN AUTO: 31.6 PG  CALC (ref 26–32)
MCHC RBC AUTO-ENTMCNC: 32.3 G/DL CAL (ref 32–36)
MCV RBC AUTO: 97.9 FL  CALC (ref 80–100)
MONOCYTES NFR BLD AUTO: 10 % (ref 2–13)
MYOGLOBIN SERPL-MCNC: 62 NG/ML (ref 0–62)
NEUTROPHILS # BLD AUTO: 2.25 THOU/UL (ref 2–7.15)
NEUTROPHILS NFR BLD AUTO: 37 % (ref 42–76)
NITRITE UR QL STRIP.AUTO: NEGATIVE
PH UR STRIP.AUTO: 7 [PH] (ref 4.5–8)
PLATELET # BLD AUTO: 249 THOU/UL (ref 130–400)
POTASSIUM SERPL-SCNC: 3.6 MMOL/L (ref 3.5–5.1)
PROT SERPL-MCNC: 7.4 G/DL (ref 6.3–8.2)
PROT UR QL STRIP.AUTO: NEGATIVE MG/DL
RBC # BLD AUTO: 4.33 MILL/UL (ref 4.2–5.6)
RBC #/AREA URNS HPF: (no result) RBC/HPF (ref 0–5)
SODIUM SERPL-SCNC: 140 MMOL/L (ref 137–146)
SP GR UR STRIP.AUTO: 1.01
TSH SERPL DL<=0.05 MIU/L-ACNC: 0.88 UIU/ML (ref 0.47–4.68)
UROBILINOGEN UR QL STRIP.AUTO: 0.2 E.U./DL
WBC #/AREA URNS HPF: (no result) WBC/HPF (ref 0–5)

## 2022-06-02 ENCOUNTER — HOSPITAL ENCOUNTER (EMERGENCY)
Dept: HOSPITAL 82 - ED | Age: 52
Discharge: HOME | DRG: 153 | End: 2022-06-02
Payer: SELF-PAY

## 2022-06-02 VITALS — DIASTOLIC BLOOD PRESSURE: 79 MMHG | SYSTOLIC BLOOD PRESSURE: 154 MMHG

## 2022-06-02 VITALS — SYSTOLIC BLOOD PRESSURE: 154 MMHG | DIASTOLIC BLOOD PRESSURE: 79 MMHG

## 2022-06-02 VITALS — DIASTOLIC BLOOD PRESSURE: 71 MMHG | SYSTOLIC BLOOD PRESSURE: 139 MMHG

## 2022-06-02 VITALS — DIASTOLIC BLOOD PRESSURE: 71 MMHG | SYSTOLIC BLOOD PRESSURE: 155 MMHG

## 2022-06-02 VITALS — SYSTOLIC BLOOD PRESSURE: 142 MMHG | DIASTOLIC BLOOD PRESSURE: 81 MMHG

## 2022-06-02 VITALS — BODY MASS INDEX: 35.06 KG/M2 | HEIGHT: 60 IN | WEIGHT: 178.57 LBS

## 2022-06-02 DIAGNOSIS — J06.9: Primary | ICD-10-CM

## 2022-06-02 DIAGNOSIS — Z20.822: ICD-10-CM

## 2022-06-02 DIAGNOSIS — Z95.0: ICD-10-CM

## 2022-06-02 DIAGNOSIS — I10: ICD-10-CM

## 2022-06-02 LAB
ALBUMIN SERPL-MCNC: 4.1 G/DL (ref 3.2–5)
ALP SERPL-CCNC: 97 U/L (ref 38–126)
ANION GAP SERPL CALCULATED.3IONS-SCNC: 13 MMOL/L
AST SERPL-CCNC: 45 U/L (ref 14–36)
BASOPHILS NFR BLD AUTO: 1 % (ref 0–3)
BUN SERPL-MCNC: 19 MG/DL (ref 7–17)
BUN/CREAT SERPL: 31
CHLORIDE SERPL-SCNC: 110 MMOL/L (ref 95–108)
CO2 SERPL-SCNC: 25 MMOL/L (ref 22–30)
CREAT SERPL-MCNC: 0.6 MG/DL (ref 0.5–1)
EOSINOPHIL NFR BLD AUTO: 7 % (ref 0–8)
ERYTHROCYTE [DISTWIDTH] IN BLOOD BY AUTOMATED COUNT: 12.3 % (ref 11.5–15.5)
HCT VFR BLD AUTO: 41.7 % (ref 37–47)
HGB BLD-MCNC: 14.3 G/DL (ref 12–16)
IMM GRANULOCYTES NFR BLD: 0.1 % (ref 0–5)
LIPASE SERPL-CCNC: 220 U/L (ref 23–300)
LYMPHOCYTES NFR BLD: 57 % (ref 15–41)
MCH RBC QN AUTO: 32.2 PG  CALC (ref 26–32)
MCHC RBC AUTO-ENTMCNC: 34.3 G/DL CAL (ref 32–36)
MCV RBC AUTO: 93.9 FL  CALC (ref 80–100)
MONOCYTES NFR BLD AUTO: 5 % (ref 2–13)
NEUTROPHILS # BLD AUTO: 2.45 THOU/UL (ref 2–7.15)
NEUTROPHILS NFR BLD AUTO: 31 % (ref 42–76)
PLATELET # BLD AUTO: 286 THOU/UL (ref 130–400)
POTASSIUM SERPL-SCNC: 3.6 MMOL/L (ref 3.5–5.1)
PROT SERPL-MCNC: 7.9 G/DL (ref 6.3–8.2)
RBC # BLD AUTO: 4.44 MILL/UL (ref 4.2–5.6)
SODIUM SERPL-SCNC: 144 MMOL/L (ref 137–146)

## 2022-07-25 ENCOUNTER — HOSPITAL ENCOUNTER (EMERGENCY)
Dept: HOSPITAL 82 - ED | Age: 52
Discharge: HOME | DRG: 392 | End: 2022-07-25
Payer: SELF-PAY

## 2022-07-25 VITALS — BODY MASS INDEX: 26.4 KG/M2 | WEIGHT: 134.48 LBS | HEIGHT: 60 IN

## 2022-07-25 VITALS — DIASTOLIC BLOOD PRESSURE: 57 MMHG | SYSTOLIC BLOOD PRESSURE: 123 MMHG

## 2022-07-25 DIAGNOSIS — R10.32: ICD-10-CM

## 2022-07-25 DIAGNOSIS — Z95.0: ICD-10-CM

## 2022-07-25 DIAGNOSIS — I10: ICD-10-CM

## 2022-07-25 DIAGNOSIS — Z87.442: ICD-10-CM

## 2022-07-25 DIAGNOSIS — R10.31: Primary | ICD-10-CM

## 2022-07-25 LAB
ALBUMIN SERPL-MCNC: 4 G/DL (ref 3.2–5)
ALP SERPL-CCNC: 95 U/L (ref 38–126)
ANION GAP SERPL CALCULATED.3IONS-SCNC: 12 MMOL/L
AST SERPL-CCNC: 49 U/L (ref 14–36)
BASOPHILS NFR BLD AUTO: 1 % (ref 0–3)
BILIRUB UR QL STRIP.AUTO: NEGATIVE
BUN SERPL-MCNC: 13 MG/DL (ref 7–17)
BUN/CREAT SERPL: 29
CHLORIDE SERPL-SCNC: 107 MMOL/L (ref 95–108)
CO2 SERPL-SCNC: 22 MMOL/L (ref 22–30)
COLOR UR AUTO: YELLOW
CREAT SERPL-MCNC: 0.5 MG/DL (ref 0.5–1)
EOSINOPHIL NFR BLD AUTO: 6 % (ref 0–8)
ERYTHROCYTE [DISTWIDTH] IN BLOOD BY AUTOMATED COUNT: 12.3 % (ref 11.5–15.5)
GLUCOSE UR STRIP.AUTO-MCNC: NEGATIVE MG/DL
HCT VFR BLD AUTO: 42.9 % (ref 37–47)
HGB BLD-MCNC: 14.5 G/DL (ref 12–16)
HGB UR QL STRIP.AUTO: NEGATIVE
IMM GRANULOCYTES NFR BLD: 0.3 % (ref 0–5)
KETONES UR STRIP.AUTO-MCNC: NEGATIVE MG/DL
LEUKOCYTE ESTERASE UR QL STRIP.AUTO: NEGATIVE
LYMPHOCYTES NFR BLD: 46 % (ref 15–41)
MCH RBC QN AUTO: 32.7 PG  CALC (ref 26–32)
MCHC RBC AUTO-ENTMCNC: 33.8 G/DL CAL (ref 32–36)
MCV RBC AUTO: 96.8 FL  CALC (ref 80–100)
MONOCYTES NFR BLD AUTO: 9 % (ref 2–13)
NEUTROPHILS # BLD AUTO: 2.23 THOU/UL (ref 2–7.15)
NEUTROPHILS NFR BLD AUTO: 39 % (ref 42–76)
NITRITE UR QL STRIP.AUTO: NEGATIVE
PH UR STRIP.AUTO: 7 [PH] (ref 4.5–8)
PLATELET # BLD AUTO: 200 THOU/UL (ref 130–400)
POTASSIUM SERPL-SCNC: 4.2 MMOL/L (ref 3.5–5.1)
PROT SERPL-MCNC: 7.5 G/DL (ref 6.3–8.2)
PROT UR QL STRIP.AUTO: NEGATIVE MG/DL
RBC # BLD AUTO: 4.43 MILL/UL (ref 4.2–5.6)
SODIUM SERPL-SCNC: 137 MMOL/L (ref 137–146)
SP GR UR STRIP.AUTO: 1.01
UROBILINOGEN UR QL STRIP.AUTO: 0.2 E.U./DL

## 2022-10-20 ENCOUNTER — HOSPITAL ENCOUNTER (EMERGENCY)
Dept: HOSPITAL 82 - ED | Age: 52
Discharge: HOME | DRG: 690 | End: 2022-10-20
Payer: SELF-PAY

## 2022-10-20 VITALS — DIASTOLIC BLOOD PRESSURE: 67 MMHG | SYSTOLIC BLOOD PRESSURE: 153 MMHG

## 2022-10-20 VITALS — HEIGHT: 60 IN | BODY MASS INDEX: 35.41 KG/M2 | WEIGHT: 180.34 LBS

## 2022-10-20 DIAGNOSIS — N39.0: Primary | ICD-10-CM

## 2022-10-20 DIAGNOSIS — R51.9: ICD-10-CM

## 2022-10-20 DIAGNOSIS — R53.83: ICD-10-CM

## 2022-10-20 LAB
ALBUMIN SERPL-MCNC: 4.9 G/DL (ref 3.2–5)
ALP SERPL-CCNC: 98 U/L (ref 38–126)
ANION GAP SERPL CALCULATED.3IONS-SCNC: 14 MMOL/L
AST SERPL-CCNC: 55 U/L (ref 14–36)
BACTERIA #/AREA URNS HPF: (no result) HPF
BASOPHILS NFR BLD AUTO: 1 % (ref 0–3)
BILIRUB UR QL STRIP.AUTO: NEGATIVE
BUN SERPL-MCNC: 22 MG/DL (ref 7–17)
BUN/CREAT SERPL: 34
CHLORIDE SERPL-SCNC: 104 MMOL/L (ref 95–108)
CO2 SERPL-SCNC: 27 MMOL/L (ref 22–30)
COLOR UR AUTO: YELLOW
CREAT SERPL-MCNC: 0.6 MG/DL (ref 0.5–1)
EOSINOPHIL NFR BLD AUTO: 0 % (ref 0–8)
EPI CELLS URNS QL MICRO: (no result) EPI/HPF
ERYTHROCYTE [DISTWIDTH] IN BLOOD BY AUTOMATED COUNT: 12.5 % (ref 11.5–15.5)
GLUCOSE UR STRIP.AUTO-MCNC: NEGATIVE MG/DL
HCT VFR BLD AUTO: 43.7 % (ref 37–47)
HGB BLD-MCNC: 14.7 G/DL (ref 12–16)
HGB UR QL STRIP.AUTO: NEGATIVE
IMM GRANULOCYTES NFR BLD: 0.1 % (ref 0–5)
KETONES UR STRIP.AUTO-MCNC: NEGATIVE MG/DL
LEUKOCYTE ESTERASE UR QL STRIP.AUTO: (no result)
LIPASE SERPL-CCNC: 64 U/L (ref 23–300)
LYMPHOCYTES NFR BLD: 17 % (ref 15–41)
MCH RBC QN AUTO: 32.1 PG  CALC (ref 26–32)
MCHC RBC AUTO-ENTMCNC: 33.6 G/DL CAL (ref 32–36)
MCV RBC AUTO: 95.4 FL  CALC (ref 80–100)
MONOCYTES NFR BLD AUTO: 3 % (ref 2–13)
NEUTROPHILS # BLD AUTO: 7.07 THOU/UL (ref 2–7.15)
NEUTROPHILS NFR BLD AUTO: 80 % (ref 42–76)
NITRITE UR QL STRIP.AUTO: NEGATIVE
PH UR STRIP.AUTO: 6.5 [PH] (ref 4.5–8)
PLATELET # BLD AUTO: 246 THOU/UL (ref 130–400)
POTASSIUM SERPL-SCNC: 4 MMOL/L (ref 3.5–5.1)
PROT SERPL-MCNC: 9 G/DL (ref 6.3–8.2)
PROT UR QL STRIP.AUTO: NEGATIVE MG/DL
RBC # BLD AUTO: 4.58 MILL/UL (ref 4.2–5.6)
SODIUM SERPL-SCNC: 141 MMOL/L (ref 137–146)
SP GR UR STRIP.AUTO: 1.02
UROBILINOGEN UR QL STRIP.AUTO: 0.2 E.U./DL
WBC #/AREA URNS HPF: (no result) WBC/HPF (ref 0–5)